# Patient Record
Sex: FEMALE | Race: WHITE | Employment: STUDENT | URBAN - NONMETROPOLITAN AREA
[De-identification: names, ages, dates, MRNs, and addresses within clinical notes are randomized per-mention and may not be internally consistent; named-entity substitution may affect disease eponyms.]

---
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
## 2023-03-10 ENCOUNTER — OFFICE VISIT (OUTPATIENT)
Age: 18
End: 2023-03-10
Payer: MEDICAID

## 2023-03-10 VITALS
SYSTOLIC BLOOD PRESSURE: 100 MMHG | HEART RATE: 94 BPM | TEMPERATURE: 98.8 F | DIASTOLIC BLOOD PRESSURE: 68 MMHG | RESPIRATION RATE: 19 BRPM | OXYGEN SATURATION: 99 % | WEIGHT: 145 LBS | HEIGHT: 70 IN | BODY MASS INDEX: 20.76 KG/M2

## 2023-03-10 DIAGNOSIS — M94.0 COSTOCHONDRITIS: Primary | ICD-10-CM

## 2023-03-10 PROCEDURE — 99203 OFFICE O/P NEW LOW 30 MIN: CPT | Performed by: PHYSICIAN ASSISTANT

## 2023-03-10 RX ORDER — DEXTROAMPHETAMINE SACCHARATE, AMPHETAMINE ASPARTATE, DEXTROAMPHETAMINE SULFATE AND AMPHETAMINE SULFATE 2.5; 2.5; 2.5; 2.5 MG/1; MG/1; MG/1; MG/1
TABLET ORAL DAILY
COMMUNITY
Start: 2023-01-25

## 2023-03-10 RX ORDER — SERTRALINE HYDROCHLORIDE 25 MG/1
TABLET, FILM COATED ORAL
COMMUNITY
Start: 2023-01-25

## 2023-03-10 RX ORDER — METHYLPREDNISOLONE 4 MG/1
TABLET ORAL
Qty: 1 KIT | Refills: 0 | Status: SHIPPED | OUTPATIENT
Start: 2023-03-10 | End: 2023-03-12

## 2023-03-10 RX ORDER — LEVONORGESTREL 52 MG/1
INTRAUTERINE DEVICE INTRAUTERINE
COMMUNITY
Start: 2021-07-19

## 2023-03-10 ASSESSMENT — ENCOUNTER SYMPTOMS
NAUSEA: 0
EYE PAIN: 0
COUGH: 0
SINUS PRESSURE: 0
ALLERGIC/IMMUNOLOGIC NEGATIVE: 1
VOMITING: 0
DIARRHEA: 0
SORE THROAT: 0
SINUS PAIN: 0
ABDOMINAL PAIN: 0
SHORTNESS OF BREATH: 0

## 2023-03-10 NOTE — PATIENT INSTRUCTIONS
Complete medrol dose pack. Can apply warm compress for comfort. Can try otc cough and cold medication to help with coughing. Please follow up with PCP or return to clinic if symptoms worsen or fail to improve. Patient verbalizes understanding and agrees with treatment plan.

## 2023-03-10 NOTE — PROGRESS NOTES
[address]  [address] [address] [place] [address]  Dept: [phone]  Dept Fax: [phone]  Loc: [phone]    Emily Pantoja is a 21 y.o. female who presents today for her medical conditions/complaints as noted below. Emily Pantoja is complaining of Other (Pain left side/pain level 3-4) and Cough    HPI:   Pt presents with left sided rib pain. She states she is tender to left lower anterio-lateral ribs that is worse with coughing, deep breaths, and palpation. She states she has  had a cough recently. She denies any treatment at home. Past Medical History:   Diagnosis Date    Asthma     Osgood-Schlatter's disease     Seasonal allergies        History reviewed. No pertinent surgical history. Family History   Problem Relation Age of Onset    No Known Problems Mother     No Known Problems Father     No Known Problems Maternal Grandmother     No Known Problems Maternal Grandfather     No Known Problems Paternal Grandmother     No Known Problems Paternal Grandfather        Social History     Tobacco Use    Smoking status: Never    Smokeless tobacco: Never    Tobacco comments:     vaping   Substance Use Topics    Alcohol use: No        Current Outpatient Medications   Medication Sig Dispense Refill    amphetamine-dextroamphetamine (ADDERALL) 10 MG tablet Take by mouth daily. sertraline (ZOLOFT) 25 MG tablet TAKE 1 TABLET BY MOUTH DAILY      levonorgestrel (MIRENA, 52 MG,) IUD 52 mg       methylPREDNISolone (MEDROL DOSEPACK) 4 MG tablet Take by mouth.  1 kit 0    lamoTRIgine (LAMICTAL) 25 MG tablet Take 1 tablet by mouth 2 times daily Take one tablet by mouth daily x 2 weeks then increase to 2 tablets by mouth daily x 2 weeks (Patient not taking: Reported on 3/10/2023) 45 tablet 0    fluticasone (FLONASE) 50 MCG/ACT nasal spray 1 spray by Nasal route daily (Patient not taking: Reported on 3/10/2023) 1 Bottle 0    albuterol sulfate  (90 Base) MCG/ACT inhaler Inhale 2 puffs into the lungs every 6 hours as needed for Wheezing (Patient not taking: Reported on 3/10/2023) 1 Inhaler 2    norethindrone-ethinyl estradiol (Randall 91 7/7/7) 0.5/0.75/1-35 MG-MCG per tablet Take 1 tablet by mouth daily 1 packet 5     No current facility-administered medications for this visit. No Known Allergies    Health Maintenance   Topic Date Due    COVID-19 Vaccine (1) Never done    Depression Screen  Never done    HIV screen  Never done    Chlamydia/GC screen  Never done    Hepatitis C screen  Never done    Flu vaccine (1) 08/01/2022    DTaP/Tdap/Td vaccine (7 - Td or Tdap) 02/19/2024    Hepatitis A vaccine  Completed    Hib vaccine  Completed    HPV vaccine  Completed    Varicella vaccine  Completed    Meningococcal (ACWY) vaccine  Aged Out    Pneumococcal 0-64 years Vaccine  Aged Out       Subjective:   Review of Systems   Constitutional:  Negative for chills, fatigue and fever. HENT:  Negative for congestion, postnasal drip, sinus pressure, sinus pain and sore throat. Eyes:  Negative for pain and visual disturbance. Respiratory:  Negative for cough and shortness of breath. Cardiovascular:  Negative for chest pain. Gastrointestinal:  Negative for abdominal pain, diarrhea, nausea and vomiting. Endocrine: Negative for cold intolerance and heat intolerance. Genitourinary:  Negative for frequency, hematuria and urgency. Musculoskeletal:  Negative for myalgias. Rib pain   Skin:  Negative for rash. Allergic/Immunologic: Negative. Neurological:  Negative for syncope, weakness, light-headedness and headaches. Hematological: Negative. Psychiatric/Behavioral: Negative. Objective    Physical Exam  Constitutional:       General: She is not in acute distress. Appearance: Normal appearance. HENT:      Head: Normocephalic and atraumatic.       Right Ear: External ear normal.      Left Ear: External ear normal.      Nose: Nose normal.      Mouth/Throat:      Mouth: Mucous membranes are moist.      Pharynx: Oropharynx is clear. Eyes:      Extraocular Movements: Extraocular movements intact. Conjunctiva/sclera: Conjunctivae normal.   Cardiovascular:      Rate and Rhythm: Normal rate and regular rhythm. Pulses: Normal pulses. Heart sounds: Normal heart sounds. Pulmonary:      Effort: Pulmonary effort is normal.      Breath sounds: Normal breath sounds. No wheezing. Abdominal:      General: Abdomen is flat. Bowel sounds are normal. There is no distension. Palpations: Abdomen is soft. Tenderness: There is no abdominal tenderness. Musculoskeletal:         General: Tenderness present. Normal range of motion. Arms:       Cervical back: Normal range of motion and neck supple. No tenderness. Skin:     General: Skin is warm and dry. Findings: No erythema. Neurological:      General: No focal deficit present. Mental Status: She is alert and oriented to person, place, and time. Psychiatric:         Mood and Affect: Mood normal.         Behavior: Behavior normal.       /68   Pulse 94   Temp 98.8 °F (37.1 °C)   Resp 19   Ht 5' 10\" (1.778 m)   Wt 145 lb (65.8 kg)   SpO2 99%   BMI 20.81 kg/m²     Assessment         Diagnosis Orders   1. Costochondritis  methylPREDNISolone (MEDROL DOSEPACK) 4 MG tablet          Plan   Complete medrol dose pack. Can apply warm compress for comfort. Can try otc cough and cold medication to help with coughing. Please follow up with PCP or return to clinic if symptoms worsen or fail to improve. Patient verbalizes understanding and agrees with treatment plan. No orders of the defined types were placed in this encounter. No results found for this visit on 03/10/23. Orders Placed This Encounter   Medications    methylPREDNISolone (MEDROL DOSEPACK) 4 MG tablet     Sig: Take by mouth.      Dispense:  1 kit     Refill:  0      New Prescriptions METHYLPREDNISOLONE (MEDROL DOSEPACK) 4 MG TABLET    Take by mouth.        Return if symptoms worsen or fail to improve.         Discussed use, benefits, and side effects of any prescribed medications. All patient questions were answered. Patient voiced understanding of care plan.   Patient was given educational materials - see patient instructions below.     Patient Instructions   Complete medrol dose pack.   Can apply warm compress for comfort.   Can try otc cough and cold medication to help with coughing.   Please follow up with PCP or return to clinic if symptoms worsen or fail to improve.  Patient verbalizes understanding and agrees with treatment plan.      Electronically signed by Mónica Wilkes PA-C on 3/10/2023 at 2:54 PM

## 2023-03-12 ENCOUNTER — APPOINTMENT (OUTPATIENT)
Dept: GENERAL RADIOLOGY | Age: 18
End: 2023-03-12
Payer: MEDICAID

## 2023-03-12 ENCOUNTER — HOSPITAL ENCOUNTER (EMERGENCY)
Age: 18
Discharge: HOME | End: 2023-03-12
Payer: MEDICAID

## 2023-03-12 VITALS
SYSTOLIC BLOOD PRESSURE: 132 MMHG | BODY MASS INDEX: 20.81 KG/M2 | WEIGHT: 145 LBS | DIASTOLIC BLOOD PRESSURE: 78 MMHG | HEART RATE: 94 BPM | OXYGEN SATURATION: 97 % | RESPIRATION RATE: 17 BRPM | TEMPERATURE: 98 F

## 2023-03-12 DIAGNOSIS — R09.1 PLEURISY: Primary | ICD-10-CM

## 2023-03-12 PROCEDURE — 99284 EMERGENCY DEPT VISIT MOD MDM: CPT

## 2023-03-12 PROCEDURE — 71101 X-RAY EXAM UNILAT RIBS/CHEST: CPT

## 2023-03-12 ASSESSMENT — ENCOUNTER SYMPTOMS: COUGH: 1

## 2023-03-12 ASSESSMENT — PAIN - FUNCTIONAL ASSESSMENT: PAIN_FUNCTIONAL_ASSESSMENT: 0-10

## 2023-03-12 NOTE — ED PROVIDER NOTES
[facility] EMERGENCY DEPT  eMERGENCY dEPARTMENT eNCOUnter      Pt Name: Jose John  MRN: [id number]  [place] 2005  Date of evaluation: 3/12/2023  Provider: Kai Delcid, [address]       Chief Complaint   Patient presents with    Cough     Cough x2 weeks producing white mucus    Rib Pain     Pt reports pain in left rib area. Pt reports pain while lying down and inhaling         HISTORY OF PRESENT ILLNESS   (Location/Symptom, Timing/Onset,Context/Setting, Quality, Duration, Modifying Factors, Severity)  Note limiting factors. Jose John is a 21 y.o. female who presents to the emergency department with left rib pain x 4 days. HAs a productive cough. No known injury,  denies pregnancy has an IUD. Pain has worsened   saw urgent care and was told it was inflammation    The history is provided by the patient. Chest Pain  Pain location:  L lateral chest  Pain quality: aching    Pain severity:  Moderate  Onset quality:  Sudden  Duration:  4 days  Timing:  Constant  Progression:  Worsening  Chronicity:  New  Associated symptoms: cough      NursingNotes were reviewed. REVIEW OF SYSTEMS    (2-9 systems for level 4, 10 or more for level 5)     Review of Systems   Respiratory:  Positive for cough. Cardiovascular:  Positive for chest pain. Except as noted above the remainder of the review of systems was reviewed and negative. PAST MEDICAL HISTORY     Past Medical History:   Diagnosis Date    Asthma     Osgood-Schlatter's disease     Seasonal allergies          SURGICALHISTORY     History reviewed. No pertinent surgical history. CURRENT MEDICATIONS       Discharge Medication List as of 3/12/2023  4:55 PM        CONTINUE these medications which have NOT CHANGED    Details   amphetamine-dextroamphetamine (ADDERALL) 10 MG tablet Take by mouth daily. Historical Med      sertraline (ZOLOFT) 25 MG tablet TAKE 1 TABLET BY MOUTH DAILYHistorical Med      levonorgestrel (MIRENA, 52 MG,) IUD 52 mg Historical Med      fluticasone (FLONASE) 50 MCG/ACT nasal spray 1 spray by Nasal route daily, Disp-1 Bottle, R-0Normal      albuterol sulfate  (90 Base) MCG/ACT inhaler Inhale 2 puffs into the lungs every 6 hours as needed for Wheezing, Disp-1 Inhaler, R-2Normal      norethindrone-ethinyl estradiol (ORTHO-NOVUM 7/7/7) 0.5/0.75/1-35 MG-MCG per tablet Take 1 tablet by mouth daily, Disp-1 packet, R-5Normal                  Patient has no known allergies. FAMILY HISTORY       Family History   Problem Relation Age of Onset    No Known Problems Mother     No Known Problems Father     No Known Problems Maternal Grandmother     No Known Problems Maternal Grandfather     No Known Problems Paternal Grandmother     No Known Problems Paternal Grandfather           SOCIAL HISTORY       Social History     Socioeconomic History    Marital status: Single     Spouse name: None    Number of children: None    Years of education: None    Highest education level: None   Tobacco Use    Smoking status: Never    Smokeless tobacco: Never    Tobacco comments:     vaping   Substance and Sexual Activity    Alcohol use: No    Drug use: Yes     Types: Marijuana (Weed)     Comment: tried one but then threw it away per pt    Sexual activity: Never       SCREENINGS    Brandin Coma Scale  Eye Opening: Spontaneous  Best Verbal Response: Oriented  Best Motor Response: Obeys commands  [place] Coma Scale Score: 15        PHYSICAL EXAM    (up to 7 for level 4, 8 or more for level 5)     ED Triage Vitals [03/12/23 1557]   BP Temp Temp src Heart Rate Resp SpO2 Height Weight - Scale   132/78 98 °F (36.7 °C) -- 94 17 97 % -- 145 lb (65.8 kg)       Physical Exam  Vitals and nursing note reviewed. Constitutional:       Appearance: Normal appearance. She is well-developed. HENT:      Head: Normocephalic and atraumatic. Eyes:      General: No scleral icterus. Right eye: No discharge. Left eye: No discharge.    Cardiovascular: Rate and Rhythm: Regular rhythm. Tachycardia present. Heart sounds: Normal heart sounds. Pulmonary:      Effort: No respiratory distress. Breath sounds: Normal breath sounds. Chest:       Musculoskeletal:      Cervical back: Normal range of motion and neck supple. Thoracic back: Tenderness present. No swelling, deformity or spasms. Back:    Neurological:      Mental Status: She is alert and oriented to person, place, and time. Psychiatric:         Behavior: Behavior normal.       RESULTS     EKG: All EKG's are interpreted by the Emergency Department Physician who either signs or Co-signsthis chart in the absence of a cardiologist.  82 sinus rhythm nonspecific changes read at 1531 by Dr. Gallegos Chimera:   Ana Achilles such as CT, Ultrasound and MRI are read by the radiologist. Plain radiographic images are visualized and preliminarily interpreted by the emergency physician with the below findings:      Interpretation per the Radiologist below, if available at the time of this note:    XR RIBS LEFT INCLUDE CHEST (MIN 3 VIEWS)   Final Result   No evidence of left rib fracture or pneumothorax. ED BEDSIDEULTRASOUND:   Performed by ED Physician -none    LABS:  Labs Reviewed - No data to display    All other labs were within normal range or not returned as of this dictation. EMERGENCY DEPARTMENT COURSE and DIFFERENTIALDIAGNOSIS/MDM:   Vitals:    Vitals:    03/12/23 1557   BP: 132/78   Pulse: 94   Resp: 17   Temp: 98 °F (36.7 °C)   SpO2: 97%   Weight: 145 lb (65.8 kg)           MDM  Number of Diagnoses or Management Options  Pleurisy: new and requires workup  Diagnosis management comments: Pt's ekg and xray are nonconcerning.   Pt educated       Amount and/or Complexity of Data Reviewed  Tests in the radiology section of CPT®: ordered  Tests in the medicine section of CPT®: ordered and reviewed               CONSULTS:  None    PROCEDURES:  Unless otherwise noted below, none     Procedures    FINAL IMPRESSION      1. Pleurisy        DISPOSITION/PLAN   DISPOSITION Decision To Discharge 03/12/2023 04:52:41 PM      PATIENT REFERRED TO:  [address]   [place] [id number] [phone]        DISCHARGE MEDICATIONS:  Discharge Medication List as of 3/12/2023  4:55 PM             (Please note that portions of this note were completed with a voice recognitionprogram.  Efforts were made to edit the dictations but occasionally words are mis-transcribed.)    ALEXYS Leonardo (electronically signed)          ALEXYS Leonardo  03/12/23 4041

## 2023-03-18 LAB
EKG P AXIS: 72 DEGREES
EKG P-R INTERVAL: 136 MS
EKG Q-T INTERVAL: 378 MS
EKG QRS DURATION: 80 MS
EKG QTC CALCULATION (BAZETT): 414 MS
EKG T AXIS: 62 DEGREES

## 2024-03-26 ENCOUNTER — OFFICE VISIT (OUTPATIENT)
Age: 19
End: 2024-03-26

## 2024-03-26 VITALS
TEMPERATURE: 97.3 F | BODY MASS INDEX: 20.37 KG/M2 | DIASTOLIC BLOOD PRESSURE: 68 MMHG | WEIGHT: 142 LBS | OXYGEN SATURATION: 99 % | HEART RATE: 72 BPM | RESPIRATION RATE: 20 BRPM | SYSTOLIC BLOOD PRESSURE: 116 MMHG

## 2024-03-26 DIAGNOSIS — J02.9 ACUTE PHARYNGITIS, UNSPECIFIED ETIOLOGY: Primary | ICD-10-CM

## 2024-03-26 DIAGNOSIS — J02.9 SORE THROAT: ICD-10-CM

## 2024-03-26 LAB — S PYO AG THROAT QL: NORMAL

## 2024-03-26 PROCEDURE — 99213 OFFICE O/P EST LOW 20 MIN: CPT

## 2024-03-26 PROCEDURE — 87880 STREP A ASSAY W/OPTIC: CPT

## 2024-03-26 ASSESSMENT — ENCOUNTER SYMPTOMS
ABDOMINAL PAIN: 0
RHINORRHEA: 0
NAUSEA: 0
COUGH: 0
SORE THROAT: 1
VOMITING: 0
WHEEZING: 0
DIARRHEA: 0

## 2024-03-26 NOTE — PATIENT INSTRUCTIONS
- Throat culture pending, will call once results are available.  - Will wait on starting antibiotic.  - Tylenol/Motrin as needed.  - Gargle with warm salt water several times a day to help reduce swelling and relieve pain. Mix 1/2 teaspoon of salt in 1 cup of warm water.   - Use a vaporizer or humidifier to add moisture to your bedroom.   - Work excuse provided for today.  - Return to the clinic or follow up with PCP if symptoms worsen or fail to improve.

## 2024-03-26 NOTE — PROGRESS NOTES
[facility]  [facility]  [address] [place] [address] [place] [address]  Dept: [phone]  Dept Fax: [phone]  Loc: [phone]    Ana Maria Christensen is a 18 y.o. female who presents today for her medical conditions/complaints as noted below.  Ana Maria Christensen is c/o of Pharyngitis (2 days)        HPI:     Ana Maria Christensen presents with complaints of sore throat. Denies any fever, congestion, cough, N/V/D. Symptoms began about 2 days ago. Denies any   OTC treatment. Reports exposure to positive strep. No recent antibiotic or steroid administration.    Past Medical History:   Diagnosis Date    Asthma     Osgood-Schlatter's disease     Seasonal allergies      History reviewed. No pertinent surgical history.    Family History   Problem Relation Age of Onset    No Known Problems Mother     No Known Problems Father     No Known Problems Maternal Grandmother     No Known Problems Maternal Grandfather     No Known Problems Paternal Grandmother     No Known Problems Paternal Grandfather        Social History     Tobacco Use    Smoking status: Never    Smokeless tobacco: Never    Tobacco comments:     vaping   Substance Use Topics    Alcohol use: No      Current Outpatient Medications   Medication Sig Dispense Refill    norethindrone-ethinyl estradiol (ORTHO-NOVUM 7/7/7) 0.5/0.75/1-35 MG-MCG per tablet Take 1 tablet by mouth daily 1 packet 5     No current facility-administered medications for this visit.     No Known Allergies    Health Maintenance   Topic Date Due    Pneumococcal 0-64 years Vaccine (1 of 2 - PCV) 09/28/2009    Depression Screen  Never done    HIV screen  Never done    Chlamydia/GC screen  Never done    Hepatitis C screen  Never done    Flu vaccine (1) 08/01/2023    COVID-19 Vaccine (3 - 2023-24 season) 09/01/2023    DTaP/Tdap/Td vaccine (7 - Td or Tdap) 02/19/2024    Hepatitis A vaccine  Completed    Hepatitis B vaccine  Completed    Hib vaccine  Completed    HPV

## 2024-03-28 LAB
BACTERIA THROAT AEROBE CULT: ABNORMAL
BACTERIA THROAT AEROBE CULT: ABNORMAL
ORGANISM: ABNORMAL